# Patient Record
Sex: MALE | Race: WHITE | NOT HISPANIC OR LATINO | Employment: UNEMPLOYED | URBAN - METROPOLITAN AREA
[De-identification: names, ages, dates, MRNs, and addresses within clinical notes are randomized per-mention and may not be internally consistent; named-entity substitution may affect disease eponyms.]

---

## 2019-08-05 ENCOUNTER — OFFICE VISIT (OUTPATIENT)
Dept: UROLOGY | Facility: CLINIC | Age: 34
End: 2019-08-05
Payer: MEDICAID

## 2019-08-05 VITALS
WEIGHT: 129.44 LBS | BODY MASS INDEX: 19.17 KG/M2 | SYSTOLIC BLOOD PRESSURE: 111 MMHG | HEART RATE: 78 BPM | DIASTOLIC BLOOD PRESSURE: 74 MMHG | HEIGHT: 69 IN

## 2019-08-05 DIAGNOSIS — N20.1 LEFT URETERAL CALCULUS: Primary | ICD-10-CM

## 2019-08-05 DIAGNOSIS — N20.0 RIGHT NEPHROLITHIASIS: ICD-10-CM

## 2019-08-05 DIAGNOSIS — N13.30 HYDRONEPHROSIS OF LEFT KIDNEY: ICD-10-CM

## 2019-08-05 DIAGNOSIS — N13.4 HYDROURETER, LEFT: ICD-10-CM

## 2019-08-05 LAB
BACTERIA #/AREA URNS AUTO: ABNORMAL /HPF
BILIRUB SERPL-MCNC: NORMAL MG/DL
BILIRUB UR QL STRIP: NEGATIVE
BLOOD URINE, POC: NORMAL
CLARITY UR REFRACT.AUTO: CLEAR
COLOR UR AUTO: YELLOW
COLOR, POC UA: YELLOW
GLUCOSE UR QL STRIP: NEGATIVE
GLUCOSE UR QL STRIP: NORMAL
HGB UR QL STRIP: ABNORMAL
KETONES UR QL STRIP: NEGATIVE
KETONES UR QL STRIP: NORMAL
LEUKOCYTE ESTERASE UR QL STRIP: ABNORMAL
LEUKOCYTE ESTERASE URINE, POC: NORMAL
MICROSCOPIC COMMENT: ABNORMAL
NITRITE UR QL STRIP: NEGATIVE
NITRITE, POC UA: NORMAL
PH UR STRIP: 6 [PH] (ref 5–8)
PH, POC UA: 6.5
PROT UR QL STRIP: NEGATIVE
PROTEIN, POC: NORMAL
RBC #/AREA URNS AUTO: 7 /HPF (ref 0–4)
SP GR UR STRIP: 1.01 (ref 1–1.03)
SPECIFIC GRAVITY, POC UA: 1.01
URN SPEC COLLECT METH UR: ABNORMAL
UROBILINOGEN, POC UA: 0.2
WBC #/AREA URNS AUTO: 6 /HPF (ref 0–5)

## 2019-08-05 PROCEDURE — 99203 OFFICE O/P NEW LOW 30 MIN: CPT | Mod: S$PBB,,, | Performed by: NURSE PRACTITIONER

## 2019-08-05 PROCEDURE — 99203 PR OFFICE/OUTPT VISIT, NEW, LEVL III, 30-44 MIN: ICD-10-PCS | Mod: S$PBB,,, | Performed by: NURSE PRACTITIONER

## 2019-08-05 PROCEDURE — 99999 PR PBB SHADOW E&M-NEW PATIENT-LVL IV: ICD-10-PCS | Mod: PBBFAC,,, | Performed by: NURSE PRACTITIONER

## 2019-08-05 PROCEDURE — 99204 OFFICE O/P NEW MOD 45 MIN: CPT | Mod: PBBFAC,PO | Performed by: NURSE PRACTITIONER

## 2019-08-05 PROCEDURE — 81002 URINALYSIS NONAUTO W/O SCOPE: CPT | Mod: PBBFAC,PO,59 | Performed by: NURSE PRACTITIONER

## 2019-08-05 PROCEDURE — 81001 URINALYSIS AUTO W/SCOPE: CPT

## 2019-08-05 PROCEDURE — 87086 URINE CULTURE/COLONY COUNT: CPT

## 2019-08-05 PROCEDURE — 99999 PR PBB SHADOW E&M-NEW PATIENT-LVL IV: CPT | Mod: PBBFAC,,, | Performed by: NURSE PRACTITIONER

## 2019-08-05 RX ORDER — ADALIMUMAB 40MG/0.8ML
KIT SUBCUTANEOUS
Refills: 3 | COMMUNITY
Start: 2019-07-08

## 2019-08-05 RX ORDER — CYCLOBENZAPRINE HCL 5 MG
5 TABLET ORAL
COMMUNITY
Start: 2014-09-03

## 2019-08-05 RX ORDER — FOLIC ACID 1 MG/1
1 TABLET ORAL
COMMUNITY
Start: 2015-03-23

## 2019-08-05 NOTE — LETTER
August 5, 2019      Ricky Arnett MD  [address] [facility] [address]           [place] [facility]  [address] [place] [address], [address]  [facility] [address]  Phone: [phone]  Fax: [phone]          Patient: Hemal Faust   MR Number: [id number]   YOB: 1985   Date of Visit: 8/5/2019       Dear Dr. Ricky Arnett:    Thank you for referring Hemal Faust to me for evaluation. Attached you will find relevant portions of my assessment and plan of care.    If you have questions, please do not hesitate to call me. I look forward to following Hemal Faust along with you.    Sincerely,    Charles Hewitt, NP    Enclosure  CC:  No Recipients    If you would like to receive this communication electronically, please contact [email or web address] or [phone] to request more information on [employer] access.    For providers and/or their staff who would like to refer a patient to [facility], please contact us through our one-stop-shop provider referral line, [facility], at [phone].    If you feel you have received this communication in error or would no longer like to receive these types of communications, please e-mail [email or web address]

## 2019-08-05 NOTE — PATIENT INSTRUCTIONS
1. U/A   2. Urine cx  3. Schedule left renal lithotripsy with Dr. Haney for Thursday, 8/15/2019.   4. Follow-up post-op.

## 2019-08-05 NOTE — PROGRESS NOTES
"Subjective:       Patient ID: Hemal Faust is a 34 y.o. male.    Chief Complaint: Nephrolithiasis (and ureterolithiasis)    Patient is new to me. He is a 33 yo WM who is here today with left ureteral calculi and right nephrolithiasis evaluation. Patient was seen in 7/2/19 in the ER for epigastric pain. CT was performed at that time and showed, "The right kidney contains a 0.4 cm stone.  There is moderate left-sided hydronephrosis and hydroureter as the result of a 1 cm stone just distal to the UPJ.  This was present on previous CT dated 02/09/2017 but does appear worse." Patient denies any pain at this time and denies passing any stone particles.    Other   This is a chronic (Nephrolithiasis and ureterolithiasis) problem. Episode onset: Approximately 2 years. The problem occurs rarely. Associated symptoms include arthralgias and fatigue. Pertinent negatives include no abdominal pain, anorexia, change in bowel habit, chills, diaphoresis, fever, headaches, nausea, swollen glands, urinary symptoms, vomiting or weakness. Nothing aggravates the symptoms. He has tried nothing for the symptoms.     Review of Systems   Constitutional: Positive for fatigue. Negative for appetite change, chills, diaphoresis and fever.   Gastrointestinal: Negative for abdominal pain, anorexia, change in bowel habit, constipation, diarrhea, nausea and vomiting.   Genitourinary: Positive for urgency. Negative for decreased urine volume, difficulty urinating, discharge, dysuria, flank pain, frequency, hematuria, penile pain, penile swelling, scrotal swelling and testicular pain.   Musculoskeletal: Positive for arthralgias and back pain (lower back/spine and hips).   Neurological: Negative for dizziness, weakness and headaches.   Psychiatric/Behavioral: Negative.        Objective:      Physical Exam   Constitutional: He is oriented to person, place, and time. He appears well-developed and well-nourished. No distress.   HENT:   Head: Normocephalic " and atraumatic.   Eyes: Pupils are equal, round, and reactive to light. EOM are normal.   Neck: Normal range of motion.   Cardiovascular: Normal rate.   Pulmonary/Chest: Effort normal. No respiratory distress.   Abdominal: Soft. There is no tenderness.   Musculoskeletal: Normal range of motion. He exhibits no edema.   No bilateral CVAT   Neurological: He is alert and oriented to person, place, and time. Coordination normal.   Skin: Skin is warm and dry.   Psychiatric: He has a normal mood and affect. His behavior is normal. Judgment and thought content normal.   Nursing note and vitals reviewed.      Assessment:       1. Left ureteral calculus    2. Hydronephrosis of left kidney    3. Hydroureter, left    4. Right nephrolithiasis        Plan:       Hemal was seen today for nephrolithiasis.    Diagnoses and all orders for this visit:    Left ureteral calculus  -     POCT URINE DIPSTICK WITHOUT MICROSCOPE  -     Urinalysis  -     Urine culture    Hydronephrosis of left kidney  -     POCT URINE DIPSTICK WITHOUT MICROSCOPE  -     Urinalysis  -     Urine culture    Hydroureter, left  -     POCT URINE DIPSTICK WITHOUT MICROSCOPE  -     Urinalysis  -     Urine culture    Right nephrolithiasis  -     POCT URINE DIPSTICK WITHOUT MICROSCOPE  -     Urinalysis  -     Urine culture    Other order  1. Schedule left renal lithotripsy with Dr. Haney for Thursday, 8/15/2019.     Follow-up post-op.     Charles Hewitt NP

## 2019-08-06 LAB — BACTERIA UR CULT: NORMAL

## 2019-08-07 ENCOUNTER — TELEPHONE (OUTPATIENT)
Dept: UROLOGY | Facility: CLINIC | Age: 34
End: 2019-08-07

## 2019-08-08 ENCOUNTER — TELEPHONE (OUTPATIENT)
Dept: UROLOGY | Facility: CLINIC | Age: 34
End: 2019-08-08

## 2019-08-08 DIAGNOSIS — N20.0 KIDNEY STONE: Primary | ICD-10-CM

## 2019-08-08 RX ORDER — SODIUM CHLORIDE 9 MG/ML
INJECTION, SOLUTION INTRAVENOUS CONTINUOUS
Status: CANCELLED | OUTPATIENT
Start: 2019-08-08

## 2019-08-08 RX ORDER — CIPROFLOXACIN 2 MG/ML
400 INJECTION, SOLUTION INTRAVENOUS
Status: CANCELLED | OUTPATIENT
Start: 2019-08-08

## 2019-08-08 NOTE — TELEPHONE ENCOUNTER
----- Message from Charles Hewitt NP sent at 8/7/2019  8:59 AM CDT -----  Please inform patient his urine cx is normal. He does not have a UTI.

## 2019-08-12 DIAGNOSIS — N20.0 KIDNEY STONE: Primary | ICD-10-CM

## 2019-08-12 DIAGNOSIS — N13.30 HYDRONEPHROSIS OF LEFT KIDNEY: ICD-10-CM

## 2019-08-15 PROBLEM — N20.0 KIDNEY STONE: Status: ACTIVE | Noted: 2019-08-15

## 2021-06-27 ENCOUNTER — OFFICE VISIT (OUTPATIENT)
Dept: URGENT CARE | Facility: CLINIC | Age: 36
End: 2021-06-27

## 2021-06-27 VITALS
BODY MASS INDEX: 20.14 KG/M2 | HEIGHT: 69 IN | HEART RATE: 82 BPM | TEMPERATURE: 98 F | DIASTOLIC BLOOD PRESSURE: 70 MMHG | OXYGEN SATURATION: 98 % | RESPIRATION RATE: 12 BRPM | SYSTOLIC BLOOD PRESSURE: 121 MMHG | WEIGHT: 136 LBS

## 2021-06-27 DIAGNOSIS — Z86.19 HISTORY OF CHLAMYDIA: ICD-10-CM

## 2021-06-27 DIAGNOSIS — Z11.3 SCREENING FOR STD (SEXUALLY TRANSMITTED DISEASE): Primary | ICD-10-CM

## 2021-06-27 LAB
BILIRUB UR QL STRIP: NEGATIVE
GLUCOSE UR QL STRIP: NEGATIVE
KETONES UR QL STRIP: NEGATIVE
LEUKOCYTE ESTERASE UR QL STRIP: NEGATIVE
PH, POC UA: 5
POC BLOOD, URINE: NEGATIVE
POC NITRATES, URINE: NEGATIVE
PROT UR QL STRIP: NEGATIVE
SP GR UR STRIP: 1.01 (ref 1–1.03)
UROBILINOGEN UR STRIP-ACNC: NORMAL (ref 0.3–2.2)

## 2021-06-27 PROCEDURE — 99204 OFFICE O/P NEW MOD 45 MIN: CPT | Mod: 25,S$GLB,, | Performed by: NURSE PRACTITIONER

## 2021-06-27 PROCEDURE — 99204 PR OFFICE/OUTPT VISIT, NEW, LEVL IV, 45-59 MIN: ICD-10-PCS | Mod: 25,S$GLB,, | Performed by: NURSE PRACTITIONER

## 2021-06-27 PROCEDURE — 81003 POCT URINALYSIS, DIPSTICK, AUTOMATED, W/O SCOPE: ICD-10-PCS | Mod: QW,S$GLB,, | Performed by: NURSE PRACTITIONER

## 2021-06-27 PROCEDURE — 81003 URINALYSIS AUTO W/O SCOPE: CPT | Mod: QW,S$GLB,, | Performed by: NURSE PRACTITIONER

## 2021-07-06 ENCOUNTER — TELEPHONE (OUTPATIENT)
Dept: URGENT CARE | Facility: CLINIC | Age: 36
End: 2021-07-06

## 2021-07-14 LAB
C TRACH RRNA SPEC QL NAA+PROBE: POSITIVE
N GONORRHOEA RRNA SPEC QL NAA+PROBE: NEGATIVE
T VAGINALIS DNA SPEC QL NAA+PROBE: NEGATIVE

## 2021-07-15 ENCOUNTER — TELEPHONE (OUTPATIENT)
Dept: URGENT CARE | Facility: CLINIC | Age: 36
End: 2021-07-15

## 2021-07-15 RX ORDER — AZITHROMYCIN 500 MG/1
1000 TABLET, FILM COATED ORAL ONCE
Qty: 2 TABLET | Refills: 0 | Status: SHIPPED | OUTPATIENT
Start: 2021-07-15 | End: 2021-07-15

## 2024-08-15 ENCOUNTER — LAB VISIT (OUTPATIENT)
Dept: LAB | Facility: HOSPITAL | Age: 39
End: 2024-08-15
Attending: STUDENT IN AN ORGANIZED HEALTH CARE EDUCATION/TRAINING PROGRAM
Payer: COMMERCIAL

## 2024-08-15 ENCOUNTER — OFFICE VISIT (OUTPATIENT)
Dept: FAMILY MEDICINE | Facility: CLINIC | Age: 39
End: 2024-08-15
Payer: COMMERCIAL

## 2024-08-15 VITALS
RESPIRATION RATE: 16 BRPM | DIASTOLIC BLOOD PRESSURE: 60 MMHG | OXYGEN SATURATION: 99 % | SYSTOLIC BLOOD PRESSURE: 96 MMHG | BODY MASS INDEX: 20.87 KG/M2 | HEIGHT: 69 IN | WEIGHT: 140.88 LBS | HEART RATE: 54 BPM

## 2024-08-15 DIAGNOSIS — Z00.00 ROUTINE GENERAL MEDICAL EXAMINATION AT A HEALTH CARE FACILITY: ICD-10-CM

## 2024-08-15 DIAGNOSIS — M45.9 ANKYLOSING SPONDYLITIS, UNSPECIFIED SITE OF SPINE: ICD-10-CM

## 2024-08-15 DIAGNOSIS — Z87.442 HISTORY OF KIDNEY STONES: ICD-10-CM

## 2024-08-15 DIAGNOSIS — Z15.89 HLA B27 (HLA B27 POSITIVE): ICD-10-CM

## 2024-08-15 DIAGNOSIS — Z00.00 ROUTINE GENERAL MEDICAL EXAMINATION AT A HEALTH CARE FACILITY: Primary | ICD-10-CM

## 2024-08-15 LAB
ALBUMIN SERPL BCP-MCNC: 4.1 G/DL (ref 3.5–5.2)
ALP SERPL-CCNC: 91 U/L (ref 55–135)
ALT SERPL W/O P-5'-P-CCNC: 12 U/L (ref 10–44)
ANION GAP SERPL CALC-SCNC: 9 MMOL/L (ref 8–16)
AST SERPL-CCNC: 16 U/L (ref 10–40)
BASOPHILS # BLD AUTO: 0.07 K/UL (ref 0–0.2)
BASOPHILS NFR BLD: 0.8 % (ref 0–1.9)
BILIRUB SERPL-MCNC: 0.5 MG/DL (ref 0.1–1)
BUN SERPL-MCNC: 14 MG/DL (ref 6–20)
CALCIUM SERPL-MCNC: 9.5 MG/DL (ref 8.7–10.5)
CHLORIDE SERPL-SCNC: 106 MMOL/L (ref 95–110)
CHOLEST SERPL-MCNC: 153 MG/DL (ref 120–199)
CHOLEST/HDLC SERPL: 2.9 {RATIO} (ref 2–5)
CO2 SERPL-SCNC: 23 MMOL/L (ref 23–29)
CREAT SERPL-MCNC: 1.2 MG/DL (ref 0.5–1.4)
DIFFERENTIAL METHOD BLD: ABNORMAL
EOSINOPHIL # BLD AUTO: 0.1 K/UL (ref 0–0.5)
EOSINOPHIL NFR BLD: 1.2 % (ref 0–8)
ERYTHROCYTE [DISTWIDTH] IN BLOOD BY AUTOMATED COUNT: 13.1 % (ref 11.5–14.5)
EST. GFR  (NO RACE VARIABLE): >60 ML/MIN/1.73 M^2
ESTIMATED AVG GLUCOSE: 94 MG/DL (ref 68–131)
GLUCOSE SERPL-MCNC: 85 MG/DL (ref 70–110)
HAV IGM SERPL QL IA: NORMAL
HBA1C MFR BLD: 4.9 % (ref 4–5.6)
HBV CORE IGM SERPL QL IA: NORMAL
HBV SURFACE AG SERPL QL IA: NORMAL
HCT VFR BLD AUTO: 44.8 % (ref 40–54)
HCV AB SERPL QL IA: NORMAL
HDLC SERPL-MCNC: 52 MG/DL (ref 40–75)
HDLC SERPL: 34 % (ref 20–50)
HGB BLD-MCNC: 15 G/DL (ref 14–18)
IMM GRANULOCYTES # BLD AUTO: 0.02 K/UL (ref 0–0.04)
IMM GRANULOCYTES NFR BLD AUTO: 0.2 % (ref 0–0.5)
LDLC SERPL CALC-MCNC: 86.8 MG/DL (ref 63–159)
LYMPHOCYTES # BLD AUTO: 1.5 K/UL (ref 1–4.8)
LYMPHOCYTES NFR BLD: 16.9 % (ref 18–48)
MCH RBC QN AUTO: 29.9 PG (ref 27–31)
MCHC RBC AUTO-ENTMCNC: 33.5 G/DL (ref 32–36)
MCV RBC AUTO: 89 FL (ref 82–98)
MONOCYTES # BLD AUTO: 0.6 K/UL (ref 0.3–1)
MONOCYTES NFR BLD: 7.1 % (ref 4–15)
NEUTROPHILS # BLD AUTO: 6.6 K/UL (ref 1.8–7.7)
NEUTROPHILS NFR BLD: 73.8 % (ref 38–73)
NONHDLC SERPL-MCNC: 101 MG/DL
NRBC BLD-RTO: 0 /100 WBC
PLATELET # BLD AUTO: 248 K/UL (ref 150–450)
PMV BLD AUTO: 10.3 FL (ref 9.2–12.9)
POTASSIUM SERPL-SCNC: 4.1 MMOL/L (ref 3.5–5.1)
PROT SERPL-MCNC: 7.6 G/DL (ref 6–8.4)
RBC # BLD AUTO: 5.02 M/UL (ref 4.6–6.2)
SODIUM SERPL-SCNC: 138 MMOL/L (ref 136–145)
T4 FREE SERPL-MCNC: 0.91 NG/DL (ref 0.71–1.51)
TRIGL SERPL-MCNC: 71 MG/DL (ref 30–150)
TSH SERPL DL<=0.005 MIU/L-ACNC: 1.03 UIU/ML (ref 0.4–4)
WBC # BLD AUTO: 8.92 K/UL (ref 3.9–12.7)

## 2024-08-15 PROCEDURE — 36415 COLL VENOUS BLD VENIPUNCTURE: CPT | Mod: PO | Performed by: STUDENT IN AN ORGANIZED HEALTH CARE EDUCATION/TRAINING PROGRAM

## 2024-08-15 PROCEDURE — 80074 ACUTE HEPATITIS PANEL: CPT | Performed by: STUDENT IN AN ORGANIZED HEALTH CARE EDUCATION/TRAINING PROGRAM

## 2024-08-15 PROCEDURE — 1160F RVW MEDS BY RX/DR IN RCRD: CPT | Mod: CPTII,S$GLB,, | Performed by: STUDENT IN AN ORGANIZED HEALTH CARE EDUCATION/TRAINING PROGRAM

## 2024-08-15 PROCEDURE — 84443 ASSAY THYROID STIM HORMONE: CPT | Performed by: STUDENT IN AN ORGANIZED HEALTH CARE EDUCATION/TRAINING PROGRAM

## 2024-08-15 PROCEDURE — 84439 ASSAY OF FREE THYROXINE: CPT | Performed by: STUDENT IN AN ORGANIZED HEALTH CARE EDUCATION/TRAINING PROGRAM

## 2024-08-15 PROCEDURE — 3008F BODY MASS INDEX DOCD: CPT | Mod: CPTII,S$GLB,, | Performed by: STUDENT IN AN ORGANIZED HEALTH CARE EDUCATION/TRAINING PROGRAM

## 2024-08-15 PROCEDURE — 80053 COMPREHEN METABOLIC PANEL: CPT | Performed by: STUDENT IN AN ORGANIZED HEALTH CARE EDUCATION/TRAINING PROGRAM

## 2024-08-15 PROCEDURE — 1159F MED LIST DOCD IN RCRD: CPT | Mod: CPTII,S$GLB,, | Performed by: STUDENT IN AN ORGANIZED HEALTH CARE EDUCATION/TRAINING PROGRAM

## 2024-08-15 PROCEDURE — 80061 LIPID PANEL: CPT | Performed by: STUDENT IN AN ORGANIZED HEALTH CARE EDUCATION/TRAINING PROGRAM

## 2024-08-15 PROCEDURE — 3078F DIAST BP <80 MM HG: CPT | Mod: CPTII,S$GLB,, | Performed by: STUDENT IN AN ORGANIZED HEALTH CARE EDUCATION/TRAINING PROGRAM

## 2024-08-15 PROCEDURE — 85025 COMPLETE CBC W/AUTO DIFF WBC: CPT | Performed by: STUDENT IN AN ORGANIZED HEALTH CARE EDUCATION/TRAINING PROGRAM

## 2024-08-15 PROCEDURE — 3074F SYST BP LT 130 MM HG: CPT | Mod: CPTII,S$GLB,, | Performed by: STUDENT IN AN ORGANIZED HEALTH CARE EDUCATION/TRAINING PROGRAM

## 2024-08-15 PROCEDURE — 83036 HEMOGLOBIN GLYCOSYLATED A1C: CPT | Performed by: STUDENT IN AN ORGANIZED HEALTH CARE EDUCATION/TRAINING PROGRAM

## 2024-08-15 PROCEDURE — 99999 PR PBB SHADOW E&M-NEW PATIENT-LVL V: CPT | Mod: PBBFAC,,, | Performed by: STUDENT IN AN ORGANIZED HEALTH CARE EDUCATION/TRAINING PROGRAM

## 2024-08-15 PROCEDURE — 99395 PREV VISIT EST AGE 18-39: CPT | Mod: S$GLB,,, | Performed by: STUDENT IN AN ORGANIZED HEALTH CARE EDUCATION/TRAINING PROGRAM

## 2024-08-15 RX ORDER — CYCLOBENZAPRINE HCL 5 MG
5 TABLET ORAL NIGHTLY PRN
Qty: 30 TABLET | Refills: 0 | Status: SHIPPED | OUTPATIENT
Start: 2024-08-15

## 2024-08-15 RX ORDER — ADALIMUMAB 40MG/0.8ML
40 KIT SUBCUTANEOUS
Qty: 6 PEN | Refills: 1 | Status: SHIPPED | OUTPATIENT
Start: 2024-08-15

## 2024-08-15 NOTE — PROGRESS NOTES
[facility]  Office Visit Note     SUBJECTIVE:   HPI: Hemal Faust  is a 39 y.o. male here to establish care     Patient's medical conditions including ankylosing spondylitis and he is HLA -B27 positive     Patient has been on Humira about 3-4 years.  He was diagnosed with ankylosing spondylitis at the age of 21.    He believes his last rheumatology follow up was August of last year   Patient has not been following up with Rheumatology due to insurance issues.      He is , has a 2 year old boy   Work at a body shop, painting cars   Denies any current marijuana use   Former smoker     For ankylosing spondylitis, patient reports having back stiffness, back spasm that is intermittent.      Surgical history notable for intussusception surgery about 8-9 years prior    No visits with results within 6 Month(s) from this visit.   Latest known visit with results is:   Orders Only on 07/12/2021   Component Date Value Ref Range Status    Chlamydia trachomatis, BRAD 07/12/2021 Positive (A)  Negative Final    Gonococcus by Nucleic Acid Amp 07/12/2021 Negative  Negative Final    Trich vag by BRAD 07/12/2021 Negative  Negative Final         Current Outpatient Medications on File Prior to Visit   Medication Sig Dispense Refill    [DISCONTINUED] acetaminophen (TYLENOL) 325 MG tablet Take 1 tablet (325 mg total) by mouth every 6 (six) hours as needed for Pain. (Patient not taking: Reported on 6/27/2021)      [DISCONTINUED] adalimumab (HUMIRA PEN) 40 mg/0.8 mL PnKt INJECT 1 PEN IN THE SKIN Q 14 DAYS (Patient not taking: Reported on 8/15/2024)  3    [DISCONTINUED] cyclobenzaprine (FLEXERIL) 5 MG tablet Take 5 mg by mouth. (Patient not taking: Reported on 8/15/2024)      [DISCONTINUED] folic acid (FOLVITE) 1 MG tablet Take 1 mg by mouth. (Patient not taking: Reported on 8/15/2024)      [DISCONTINUED] ranitidine (ZANTAC) 150 MG capsule Take 1 capsule (150 mg total) by mouth once daily. (Patient not taking:  Reported on 8/15/2024) 15 capsule 0    [DISCONTINUED] tamsulosin (FLOMAX) 0.4 mg Cap Take 1 capsule (0.4 mg total) by mouth every evening. (Patient not taking: Reported on 6/27/2021) 30 capsule 1     No current facility-administered medications on file prior to visit.     Past Medical History:   Diagnosis Date    Ankylosing spondylitis     Intussusception      Past Surgical History:   Procedure Laterality Date    ABDOMINAL SURGERY      ARTHROSCOPY OF KNEE Right     COLON SURGERY      EXTRACORPOREAL SHOCK WAVE LITHOTRIPSY Left 8/15/2019    Procedure: LITHOTRIPSY, ESWL;  Surgeon: Hemal Haney MD;  [facility];  Service: Urology;  Laterality: Left;     Past Surgical History:   Procedure Laterality Date    ABDOMINAL SURGERY      ARTHROSCOPY OF KNEE Right     COLON SURGERY      EXTRACORPOREAL SHOCK WAVE LITHOTRIPSY Left 8/15/2019    Procedure: LITHOTRIPSY, ESWL;  Surgeon: Hemal Haney MD;  Location: [facility];  Service: Urology;  Laterality: Left;     Family History   Problem Relation Name Age of Onset    Throat cancer Mother      No Known Problems Father         Marital Status:   Alcohol History:  reports current alcohol use.  Tobacco History:  reports that he quit smoking about 2 years ago. His smoking use included cigarettes. He has been exposed to tobacco smoke. He has never used smokeless tobacco.  Drug History:  reports that he does not currently use drugs after having used the following drugs: Marijuana.    Health Maintenance Topics with due status: Not Due       Topic Last Completion Date    Influenza Vaccine Not Due       There is no immunization history on file for this patient.    Review of patient's allergies indicates:  No Known Allergies    Current Outpatient Medications:     adalimumab (HUMIRA PEN) PnKt injection, Inject 1 pen  (40 mg total) into the skin every 14 (fourteen) days., Disp: 6 pen , Rfl: 1    cyclobenzaprine (FLEXERIL) 5 MG tablet, Take 1 tablet (5 mg total) by mouth nightly  "as needed for Muscle spasms., Disp: 30 tablet, Rfl: 0    Review of Systems   Constitutional:  Negative for chills and fever.   Respiratory:  Negative for chest tightness and shortness of breath.    Gastrointestinal:  Negative for blood in stool, nausea and vomiting.   Genitourinary:  Negative for hematuria.   Musculoskeletal:  Positive for back pain. Negative for gait problem.        Back spasm, back stiffness       OBJECTIVE:      Vitals:    08/15/24 0828 08/15/24 0855   BP: (!) 96/58 96/60   BP Location: Right arm Right arm   Patient Position: Sitting Sitting   BP Method: Medium (Manual) Medium (Manual)   Pulse: (!) 54    Resp: 16    SpO2: 99%    Weight: 63.9 kg (140 lb 14 oz)    Height: 5' 9" (1.753 m)      Physical Exam  Constitutional:       General: He is not in acute distress.     Appearance: He is not ill-appearing or toxic-appearing.   HENT:      Head: Normocephalic and atraumatic.      Mouth/Throat:      Mouth: Mucous membranes are moist.      Pharynx: Uvula midline. No pharyngeal swelling.   Cardiovascular:      Rate and Rhythm: Normal rate and regular rhythm.   Pulmonary:      Effort: Pulmonary effort is normal. No tachypnea, bradypnea, accessory muscle usage, prolonged expiration or respiratory distress.      Breath sounds: Normal breath sounds. No stridor. No wheezing, rhonchi or rales.   Abdominal:      General: Abdomen is flat. Bowel sounds are normal. There is no distension.      Palpations: Abdomen is soft.      Tenderness: There is no abdominal tenderness. There is no guarding or rebound.   Musculoskeletal:      Comments: Cervical, thoracic, and lumbar spine nontender to palpation    Neurological:      General: No focal deficit present.      Mental Status: He is alert.   Psychiatric:         Mood and Affect: Mood normal.         Behavior: Behavior normal.        Assessment:       1. Routine general medical examination at a health care facility    2. Ankylosing spondylitis, unspecified site of " spine    3. HLA B27 (HLA B27 positive)    4. History of kidney stones        Plan:       Routine general medical examination at a health care facility  -     CBC Auto Differential; Future; Expected date: 08/15/2024  -     Comprehensive Metabolic Panel; Future; Expected date: 08/15/2024  -     Hemoglobin A1C; Future; Expected date: 08/15/2024  -     Lipid Panel; Future; Expected date: 08/15/2024  -     Hepatitis Panel, Acute; Future; Expected date: 08/15/2024  -     Ambulatory referral/consult to Rheumatology; Future; Expected date: 08/21/2024  -     TSH; Future; Expected date: 08/15/2024  -     T4, Free; Future; Expected date: 08/15/2024  -     FOLATE; Future; Expected date: 08/15/2024  Advised on the importance of annual eye exam and Q6 months dental cleaning/exam     Ankylosing spondylitis, unspecified site of spine  -     CBC Auto Differential; Future; Expected date: 08/15/2024  -     Comprehensive Metabolic Panel; Future; Expected date: 08/15/2024  -     Hemoglobin A1C; Future; Expected date: 08/15/2024  -     Lipid Panel; Future; Expected date: 08/15/2024  -     Hepatitis Panel, Acute; Future; Expected date: 08/15/2024  -     Ambulatory referral/consult to Rheumatology; Future; Expected date: 08/21/2024  -     adalimumab (HUMIRA PEN) PnKt injection; Inject 1 pen  (40 mg total) into the skin every 14 (fourteen) days.  Dispense: 6 pen ; Refill: 1  -     cyclobenzaprine (FLEXERIL) 5 MG tablet; Take 1 tablet (5 mg total) by mouth nightly as needed for Muscle spasms.  Dispense: 30 tablet; Refill: 0  Humira refill sent in  Explained to the patient that Humira needs to be monitored carefully by rheumatology. Further refill will be provided by rheumatology  Flexeril for intermittent muscle spasm -- advised to use it nightly PRN as it can cause drowsiness. Avoid driving or operating heavy machinery     HLA B27 (HLA B27 positive)    History of kidney stones  S/p lithotripsy   Has not been having kidney stones  Continue  with ample hydration       Counseled on age and gender appropriate medical preventative services, including cancer screenings, immunizations, overall nutritional health, need for a consistent exercise regimen and an overall push towards maintaining a vigorous and active lifestyle.      Follow up in 6 months for ankylosing spondylitis with yesenia Reddy M.D.  8/15/2024    This note was created using [employer] voice recognition software that occasionally misinterprets phrases or words

## 2024-08-15 NOTE — PATIENT INSTRUCTIONS
Mikel Recio,     If you are due for any health screening(s) below please notify me so we can arrange them to be ordered and scheduled. Most healthy patients at your age complete them, but you are free to accept or refuse.     If you can't do it, I'll definitely understand. If you can, I'd certainly appreciate it!    All of your core healthy metrics are met.

## 2024-08-16 ENCOUNTER — TELEPHONE (OUTPATIENT)
Dept: FAMILY MEDICINE | Facility: CLINIC | Age: 39
End: 2024-08-16
Payer: COMMERCIAL

## 2024-08-16 NOTE — TELEPHONE ENCOUNTER
Spoke to pharmacist who advised wanting to confirm pt is taking Humira (traditional) pen or Humira citrate free pen. Pharmacist states they normally fill the Humira citrate free for most patients so they were just wanting to confirm prior to filling. Please advise if correct RX was sent

## 2024-08-16 NOTE — TELEPHONE ENCOUNTER
----- Message from Yanna Cartagena sent at 8/16/2024 10:15 AM CDT -----  Type: Needs Medical Advice  Who Called:  Kd from [facility] employee pharmacy  Symptoms (please be specific):  said he need to speak to the office about pt's adalimumab (HUMIRA PEN) PnKt injection--please call and advise    Pharmacy name and phone #:    [facility] - [address] [address]  Phone: [phone] Fax: [phone]  Best Call Back Number: [phone]  Additional Information: thank you

## 2024-08-21 ENCOUNTER — PATIENT MESSAGE (OUTPATIENT)
Dept: FAMILY MEDICINE | Facility: CLINIC | Age: 39
End: 2024-08-21
Payer: COMMERCIAL

## 2024-08-21 DIAGNOSIS — M45.9 ANKYLOSING SPONDYLITIS, UNSPECIFIED SITE OF SPINE: ICD-10-CM

## 2024-08-21 DIAGNOSIS — Z00.00 ROUTINE GENERAL MEDICAL EXAMINATION AT A HEALTH CARE FACILITY: Primary | ICD-10-CM

## 2024-08-21 RX ORDER — CYCLOBENZAPRINE HCL 5 MG
5 TABLET ORAL NIGHTLY PRN
Qty: 30 TABLET | Refills: 0 | Status: SHIPPED | OUTPATIENT
Start: 2024-08-21

## 2024-08-23 ENCOUNTER — LAB VISIT (OUTPATIENT)
Dept: LAB | Facility: HOSPITAL | Age: 39
End: 2024-08-23
Attending: STUDENT IN AN ORGANIZED HEALTH CARE EDUCATION/TRAINING PROGRAM
Payer: COMMERCIAL

## 2024-08-23 DIAGNOSIS — Z00.00 ROUTINE GENERAL MEDICAL EXAMINATION AT A HEALTH CARE FACILITY: ICD-10-CM

## 2024-08-23 LAB
FOLATE SERPL-MCNC: 5.7 NG/ML (ref 4–24)
HIV 1+2 AB+HIV1 P24 AG SERPL QL IA: NORMAL
TREPONEMA PALLIDUM IGG+IGM AB [PRESENCE] IN SERUM OR PLASMA BY IMMUNOASSAY: NONREACTIVE

## 2024-08-23 PROCEDURE — 82746 ASSAY OF FOLIC ACID SERUM: CPT | Performed by: STUDENT IN AN ORGANIZED HEALTH CARE EDUCATION/TRAINING PROGRAM

## 2024-08-23 PROCEDURE — 87661 TRICHOMONAS VAGINALIS AMPLIF: CPT | Performed by: STUDENT IN AN ORGANIZED HEALTH CARE EDUCATION/TRAINING PROGRAM

## 2024-08-23 PROCEDURE — 36415 COLL VENOUS BLD VENIPUNCTURE: CPT | Mod: PO | Performed by: STUDENT IN AN ORGANIZED HEALTH CARE EDUCATION/TRAINING PROGRAM

## 2024-08-23 PROCEDURE — 86593 SYPHILIS TEST NON-TREP QUANT: CPT | Performed by: STUDENT IN AN ORGANIZED HEALTH CARE EDUCATION/TRAINING PROGRAM

## 2024-08-23 PROCEDURE — 87389 HIV-1 AG W/HIV-1&-2 AB AG IA: CPT | Performed by: STUDENT IN AN ORGANIZED HEALTH CARE EDUCATION/TRAINING PROGRAM

## 2024-08-26 LAB
SPECIMEN SOURCE: NORMAL
T VAGINALIS RRNA SPEC QL NAA+PROBE: NEGATIVE

## 2024-09-13 ENCOUNTER — PATIENT MESSAGE (OUTPATIENT)
Dept: FAMILY MEDICINE | Facility: CLINIC | Age: 39
End: 2024-09-13
Payer: COMMERCIAL

## 2024-09-18 ENCOUNTER — PATIENT MESSAGE (OUTPATIENT)
Dept: FAMILY MEDICINE | Facility: CLINIC | Age: 39
End: 2024-09-18
Payer: COMMERCIAL

## 2025-02-17 ENCOUNTER — TELEPHONE (OUTPATIENT)
Dept: FAMILY MEDICINE | Facility: CLINIC | Age: 40
End: 2025-02-17
Payer: COMMERCIAL

## 2025-02-25 ENCOUNTER — RESULTS FOLLOW-UP (OUTPATIENT)
Dept: FAMILY MEDICINE | Facility: CLINIC | Age: 40
End: 2025-02-25

## 2025-02-25 ENCOUNTER — HOSPITAL ENCOUNTER (OUTPATIENT)
Dept: RADIOLOGY | Facility: HOSPITAL | Age: 40
Discharge: HOME | End: 2025-02-25
Payer: COMMERCIAL

## 2025-02-25 ENCOUNTER — OFFICE VISIT (OUTPATIENT)
Dept: FAMILY MEDICINE | Facility: CLINIC | Age: 40
End: 2025-02-25
Payer: COMMERCIAL

## 2025-02-25 VITALS
HEIGHT: 69 IN | HEART RATE: 99 BPM | WEIGHT: 136.88 LBS | SYSTOLIC BLOOD PRESSURE: 100 MMHG | OXYGEN SATURATION: 96 % | TEMPERATURE: 98 F | DIASTOLIC BLOOD PRESSURE: 60 MMHG | BODY MASS INDEX: 20.27 KG/M2

## 2025-02-25 DIAGNOSIS — R11.2 NAUSEA AND VOMITING, UNSPECIFIED VOMITING TYPE: ICD-10-CM

## 2025-02-25 DIAGNOSIS — R10.31 RIGHT LOWER QUADRANT PAIN: ICD-10-CM

## 2025-02-25 DIAGNOSIS — R11.2 NAUSEA AND VOMITING, UNSPECIFIED VOMITING TYPE: Primary | ICD-10-CM

## 2025-02-25 DIAGNOSIS — R10.33 PERIUMBILICAL ABDOMINAL PAIN: ICD-10-CM

## 2025-02-25 PROBLEM — R19.7 DIARRHEA: Status: ACTIVE | Noted: 2025-02-25

## 2025-02-25 PROBLEM — E87.1 HYPONATREMIA: Status: ACTIVE | Noted: 2025-02-25

## 2025-02-25 PROBLEM — K52.9 ENTERITIS: Status: ACTIVE | Noted: 2025-02-25

## 2025-02-25 PROCEDURE — 74177 CT ABD & PELVIS W/CONTRAST: CPT | Mod: 26,,, | Performed by: RADIOLOGY

## 2025-02-25 PROCEDURE — 1159F MED LIST DOCD IN RCRD: CPT | Mod: CPTII,S$GLB,,

## 2025-02-25 PROCEDURE — 25500020 PHARM REV CODE 255

## 2025-02-25 PROCEDURE — 99999 PR PBB SHADOW E&M-EST. PATIENT-LVL III: CPT | Mod: PBBFAC,,,

## 2025-02-25 PROCEDURE — 3008F BODY MASS INDEX DOCD: CPT | Mod: CPTII,S$GLB,,

## 2025-02-25 PROCEDURE — 74177 CT ABD & PELVIS W/CONTRAST: CPT | Mod: TC

## 2025-02-25 PROCEDURE — 3078F DIAST BP <80 MM HG: CPT | Mod: CPTII,S$GLB,,

## 2025-02-25 PROCEDURE — 3074F SYST BP LT 130 MM HG: CPT | Mod: CPTII,S$GLB,,

## 2025-02-25 PROCEDURE — 1160F RVW MEDS BY RX/DR IN RCRD: CPT | Mod: CPTII,S$GLB,,

## 2025-02-25 PROCEDURE — 99214 OFFICE O/P EST MOD 30 MIN: CPT | Mod: S$GLB,,,

## 2025-02-25 RX ORDER — ONDANSETRON 4 MG/1
4 TABLET, FILM COATED ORAL
Status: CANCELLED | OUTPATIENT
Start: 2025-02-25 | End: 2025-02-25

## 2025-02-25 RX ADMIN — IOHEXOL 30 ML: 300 INJECTION, SOLUTION INTRAVENOUS at 10:02

## 2025-02-25 RX ADMIN — IOHEXOL 75 ML: 350 INJECTION, SOLUTION INTRAVENOUS at 10:02

## 2025-02-25 NOTE — PROGRESS NOTES
Subjective:       Patient ID: Hemal Faust is a 39 y.o. male.    Chief Complaint: abdominal pain       Hemal Faust is a 39 y.o. male with ankylosing spondylitis, h/o kidney stones who presents to clinic for evaluation of intermittent abdominal pain x 5 days. Pain is constantly 3/10 and when worsening it becomes 8/10 in intensity. Abdominal pain began late Friday night in the periumbilical area. He states when pain starts it occurs in periumbilical area and radiates to RLQ. Pain is characterized by stabbing. Pain is worse with movement (turning over in bed, walking, driving). He reports brief episodes of nausea over the weekend, but this morning had 2 episodes of sweats, nausea, vomiting and diarrhea. Denies fever, chills, blood in the stool, dysuria, flank pain or hematuria. Pain is not similar to what he experienced with kidney stone. He took Tylenol and Ibuprofen last night and Pepto over the weekend without relief.          Review of Systems   Constitutional:  Positive for diaphoresis. Negative for chills and fever.   Gastrointestinal:  Positive for abdominal pain, diarrhea, nausea and vomiting. Negative for blood in stool.   Genitourinary:  Negative for dysuria, flank pain and hematuria.         Past Medical History:   Diagnosis Date    Ankylosing spondylitis     Intussusception        Review of patient's allergies indicates:  No Known Allergies    Current Medications[1]    Objective:        Physical Exam  Vitals reviewed.   Constitutional:       General: He is not in acute distress.     Appearance: Normal appearance.   HENT:      Head: Normocephalic and atraumatic.   Eyes:      Conjunctiva/sclera: Conjunctivae normal.   Cardiovascular:      Rate and Rhythm: Normal rate and regular rhythm.      Heart sounds: Normal heart sounds.   Pulmonary:      Effort: Pulmonary effort is normal.      Breath sounds: Normal breath sounds.   Abdominal:      General: Bowel sounds are normal.      Palpations: Abdomen is soft.  "     Tenderness: There is abdominal tenderness in the right lower quadrant. There is guarding. There is no rebound. Positive signs include Rovsing's sign, McBurney's sign and obturator sign. Negative signs include psoas sign.   Musculoskeletal:         General: Normal range of motion.      Cervical back: Normal range of motion.   Skin:     General: Skin is warm and dry.   Neurological:      Mental Status: He is alert and oriented to person, place, and time.   Psychiatric:         Behavior: Behavior normal.           Visit Vitals  /60 (BP Location: Right arm, Patient Position: Sitting)   Pulse 99   Temp 98.1 °F (36.7 °C) (Oral)   Ht 5' 9" (1.753 m)   Wt 62.1 kg (136 lb 14.5 oz)   SpO2 96%   BMI 20.22 kg/m²      Assessment:         1. Nausea and vomiting, unspecified vomiting type    2. Periumbilical abdominal pain    3. Right lower quadrant pain        Plan:         Diagnoses and all orders for this visit:    Nausea and vomiting, unspecified vomiting type  -     CBC Auto Differential; Future  -     Comprehensive Metabolic Panel; Future  -     Lipase; Future  -     CT Abdomen Pelvis With IV Contrast Routine Oral Contrast; Future    Periumbilical abdominal pain  -     CT Abdomen Pelvis With IV Contrast Routine Oral Contrast; Future    Right lower quadrant pain  -     CBC Auto Differential; Future  -     Comprehensive Metabolic Panel; Future  -     Lipase; Future  -     CT Abdomen Pelvis With IV Contrast Routine Oral Contrast; Future    Will get STAT imaging and labs to assess for appendicitis. Further recommendations made pending labs/ imaging.     If symptoms worsen, pt instructed to go to ED.        Family Medicine Physician Assistant          All of your core healthy metrics are met.       I spent a total of 30 minutes on the day of the visit.This includes face to face time and non-face to face time preparing to see the patient (eg, review of tests), obtaining and/or reviewing separately obtained history, " documenting clinical information in the electronic or other health record, independently interpreting results and communicating results to the patient/family/caregiver, or care coordinator.         [1]   Current Outpatient Medications:     adalimumab (HUMIRA PEN) PnKt injection, Inject 1 pen  (40 mg total) into the skin every 14 (fourteen) days., Disp: 6 pen , Rfl: 1    cyclobenzaprine (FLEXERIL) 5 MG tablet, Take 1 tablet (5 mg total) by mouth nightly as needed for Muscle spasms., Disp: 30 tablet, Rfl: 0

## 2025-03-05 ENCOUNTER — OFFICE VISIT (OUTPATIENT)
Dept: GASTROENTEROLOGY | Facility: CLINIC | Age: 40
End: 2025-03-05
Payer: COMMERCIAL

## 2025-03-05 VITALS — WEIGHT: 136.88 LBS | BODY MASS INDEX: 21.48 KG/M2 | HEIGHT: 67 IN

## 2025-03-05 DIAGNOSIS — Z87.898 HISTORY OF DIARRHEA: ICD-10-CM

## 2025-03-05 DIAGNOSIS — R10.31 RIGHT LOWER QUADRANT ABDOMINAL PAIN: ICD-10-CM

## 2025-03-05 DIAGNOSIS — D84.9 IMMUNOSUPPRESSED STATUS: ICD-10-CM

## 2025-03-05 DIAGNOSIS — Z09 HOSPITAL DISCHARGE FOLLOW-UP: Primary | ICD-10-CM

## 2025-03-05 DIAGNOSIS — R93.3 ABNORMAL CT SCAN, GASTROINTESTINAL TRACT: ICD-10-CM

## 2025-03-05 PROCEDURE — 1159F MED LIST DOCD IN RCRD: CPT | Mod: CPTII,S$GLB,,

## 2025-03-05 PROCEDURE — 1160F RVW MEDS BY RX/DR IN RCRD: CPT | Mod: CPTII,S$GLB,,

## 2025-03-05 PROCEDURE — 99203 OFFICE O/P NEW LOW 30 MIN: CPT | Mod: S$GLB,,,

## 2025-03-05 PROCEDURE — 99999 PR PBB SHADOW E&M-EST. PATIENT-LVL IV: CPT | Mod: PBBFAC,,,

## 2025-03-05 PROCEDURE — 3008F BODY MASS INDEX DOCD: CPT | Mod: CPTII,S$GLB,,

## 2025-03-05 NOTE — PROGRESS NOTES
"Subjective:       Patient ID: Hemal Faust is a 39 y.o. male Body mass index is 21.44 kg/m².    Chief Complaint: Abdominal Pain and Hospital Follow Up    This patient is new to me.  Referring Provider: Dr. Veronika Valencia for RLQ pain.     Reviewed hospital discharge summary report from 02/26/25,  "Admission Date: 2/25/2025  Hospital Length of Stay: 0 days  Discharge Date and Time: 2/26/2025  4:19 PM  Attending Physician: No att. providers found   Discharging Provider: Veronika Valencia MD  Primary Care Provider: Veronika Reddy MD     Primary Care Team: Networked reference to record PCT      HPI: Patient is 39-year-old male with ankylosing spondylitis maintained on Humira, history of nephrolithiasis, history of tobacco use currently vaping, that presented to the emergency department after being evaluated by his primary care provider as outpatient with complaints of abdominal pain 1st noted 02/21/2025.  Patient reports intermittent stabbing pain to periumbilical area with radiation to right lower quadrant described as 3/10 at baseline waxing and waning as intense as 8/10.  Pain is exacerbated by movement, walking, driving.  Patient reports associated nausea which began in the p.m. to 03/20/2025 with worsening nausea, diaphoresis vomiting and diarrhea this a.m..  Patient denies hematemesis coffee-grounds bright red blood per rectum.  Patient denies flank pain CVA tenderness or hematuria.  Patient reports utilizing ibuprofen, Tylenol and Pepto-Bismol with no improvement in symptoms.  CT with findings of severe enteritis primarily in the distal ileum .  Patient to be admitted with surgical consult, IV fluids and IV antibiotics.     * No surgery found *       Hospital Course: The patient was admitted on IV fluids, NPO status for his enteritis.  General surgery was consulted and evaluated, no surgical issues identified.  It was felt that the CT scan findings were inconsistent with Crohn's disease.  However, follow-up for GI " "was arranged on discharge.  Diet was started with clear liquids and advanced to a regular diet which he tolerated.  He was discharged on oral antibiotics with close outpatient follow-up."    Abdominal Pain  This is a new problem. The current episode started 1 to 4 weeks ago (started prior to hospital admission 02/25/25 - was diagnosed with severe enteritis in distal ileum and norovirus). The onset quality is sudden. The problem occurs constantly. The problem has been resolved (started improving on Sunday and resolved by Monday). The pain is located in the RLQ. The pain is at a severity of 0/10 (denies pain currently). The patient is experiencing no pain. The quality of the pain is burning (stabbing). Associated symptoms include diarrhea (resolved - occurred around the time of hospital visit; currently having 1-2 BMs daily rated 2 and 5 on [place] scale; taking daily probiotic; GI pathogens panel detected norovirus 02/25/2025 - all other stool studies unremarkable). Pertinent negatives include no belching, constipation, dysuria, fever, flatus, hematochezia, hematuria, melena, nausea (resolved - has not had to take Phenergan), vomiting (resolved) or weight loss. The pain is aggravated by movement and palpation. The pain is relieved by Nothing. He has tried acetaminophen, antibiotics and oral narcotic analgesics (Currently taking daily probiotic; was prescribed oxycodone by ER; completed cipro and flagyl; past tx:  Pepto-Bismol) for the symptoms. The treatment provided significant relief. Prior diagnostic workup includes GI consult and CT scan. His past medical history is significant for abdominal surgery (Patient reports hx of intestinal surgery 10-12 years ago related to intussusception). There is no history of colon cancer, Crohn's disease, gallstones, GERD, irritable bowel syndrome, pancreatitis, PUD or ulcerative colitis. Patient's medical history includes kidney stones. Patient's medical history does not include " UTI.     Review of Systems   Constitutional:  Negative for activity change, appetite change, chills, diaphoresis, fatigue, fever, unexpected weight change and weight loss.   HENT:  Negative for sore throat and trouble swallowing.    Respiratory:  Negative for cough, choking and shortness of breath.    Cardiovascular:  Negative for chest pain.   Gastrointestinal:  Positive for abdominal pain and diarrhea (resolved - occurred around the time of hospital visit; currently having 1-2 BMs daily rated 2 and 5 on [place] scale; taking daily probiotic; GI pathogens panel detected norovirus 02/25/2025 - all other stool studies unremarkable). Negative for abdominal distention, anal bleeding, blood in stool, constipation, flatus, hematochezia, melena, nausea (resolved - has not had to take Phenergan), rectal pain and vomiting (resolved).   Genitourinary:  Negative for dysuria and hematuria.       No LMP for male patient.  Past Medical History:   Diagnosis Date    Ankylosing spondylitis     Intussusception      Past Surgical History:   Procedure Laterality Date    ABDOMINAL SURGERY      ARTHROSCOPY OF KNEE Right     COLON SURGERY      hx of intussusception 10-12 years ago    EXTRACORPOREAL SHOCK WAVE LITHOTRIPSY Left 08/15/2019    Procedure: LITHOTRIPSY, ESWL;  Surgeon: Hemal Haney MD;  Location: [facility];  Service: Urology;  Laterality: Left;     Family History   Problem Relation Name Age of Onset    Throat cancer Mother      No Known Problems Father      Colon cancer Neg Hx      Crohn's disease Neg Hx      Ulcerative colitis Neg Hx      Stomach cancer Neg Hx      Esophageal cancer Neg Hx       Social History[1]  Wt Readings from Last 10 Encounters:   03/05/25 62.1 kg (136 lb 14.5 oz)   02/25/25 61.3 kg (135 lb 2.3 oz)   02/25/25 62.1 kg (136 lb 14.5 oz)   08/15/24 63.9 kg (140 lb 14 oz)   06/27/21 61.7 kg (136 lb)   08/15/19 58.9 kg (129 lb 13.6 oz)   08/05/19 58.7 kg (129 lb 6.6 oz)   07/02/19 59 kg (130 lb)   02/09/17  66.7 kg (147 lb)     Lab Results   Component Value Date    WBC 8.76 02/26/2025    HGB 14.8 02/26/2025    HCT 42.2 02/26/2025    MCV 88 02/26/2025     02/26/2025     CMP  Sodium   Date Value Ref Range Status   02/26/2025 133 (L) 136 - 145 mmol/L Final     Potassium   Date Value Ref Range Status   02/26/2025 4.2 3.5 - 5.1 mmol/L Final     Comment:     Anion Gap reference range revised on 4/28/2023     Chloride   Date Value Ref Range Status   02/26/2025 105 95 - 110 mmol/L Final     CO2   Date Value Ref Range Status   02/26/2025 20 (L) 23 - 29 mmol/L Final     Glucose   Date Value Ref Range Status   02/26/2025 79 70 - 110 mg/dL Final     Comment:     The ADA recommends the following guidelines for fasting glucose:    Normal:       less than 100 mg/dL    Prediabetes:  100 mg/dL to 125 mg/dL    Diabetes:     126 mg/dL or higher       BUN   Date Value Ref Range Status   02/26/2025 13 6 - 20 mg/dL Final     Creatinine   Date Value Ref Range Status   02/26/2025 1.19 0.50 - 1.40 mg/dL Final     Calcium   Date Value Ref Range Status   02/26/2025 8.4 (L) 8.7 - 10.5 mg/dL Final     Total Protein   Date Value Ref Range Status   02/26/2025 6.3 6.0 - 8.4 g/dL Final     Albumin   Date Value Ref Range Status   02/26/2025 3.3 (L) 3.5 - 5.2 g/dL Final     Total Bilirubin   Date Value Ref Range Status   02/26/2025 0.9 0.2 - 1.0 mg/dL Final     Alkaline Phosphatase   Date Value Ref Range Status   02/26/2025 57 40 - 150 U/L Final     AST   Date Value Ref Range Status   02/26/2025 14 10 - 40 U/L Final     ALT   Date Value Ref Range Status   02/26/2025 9 (L) 10 - 44 U/L Final     Anion Gap   Date Value Ref Range Status   02/26/2025 8 8 - 16 mmol/L Final     Comment:     Anion Gap reference range revised on 4/28/2023     eGFR if    Date Value Ref Range Status   08/13/2019 >60.0 >60 mL/min/1.73 m^2 Final     eGFR if non    Date Value Ref Range Status   08/13/2019 >60.0 >60 mL/min/1.73 m^2 Final      Comment:     Calculation used to obtain the estimated glomerular filtration  rate (eGFR) is the CKD-EPI equation.        Lab Results   Component Value Date    LIPASE 84 (H) 02/25/2025     Lab Results   Component Value Date    LIPASERES 109 (H) 02/25/2025     Lab Results   Component Value Date    TSH 1.026 08/15/2024     Reviewed prior medical records including radiology report of CT abdomen and pelvis 02/25/2025, CT abdomen and pelvis 07/02/2019, CT abdomen and pelvis 02/09/2017, KUB 03/06/2014 & endoscopy history (see surgical history).    Objective:      Physical Exam  Vitals and nursing note reviewed.   Constitutional:       General: He is not in acute distress.     Appearance: Normal appearance. He is not ill-appearing.   HENT:      Mouth/Throat:      Lips: Pink. No lesions.   Cardiovascular:      Heart sounds: Normal heart sounds.   Pulmonary:      Effort: Pulmonary effort is normal. No respiratory distress.      Breath sounds: Normal breath sounds.   Abdominal:      General: Bowel sounds are normal. There is no distension or abdominal bruit. There are no signs of injury.      Palpations: Abdomen is soft. There is no shifting dullness, fluid wave, hepatomegaly, splenomegaly or mass.      Tenderness: There is abdominal tenderness (mild) in the right lower quadrant. There is no guarding or rebound. Negative signs include Thomason's sign, Rovsing's sign and McBurney's sign.   Skin:     General: Skin is warm and dry.      Coloration: Skin is not jaundiced or pale.   Neurological:      Mental Status: He is alert and oriented to person, place, and time.   Psychiatric:         Attention and Perception: Attention normal.         Mood and Affect: Mood normal.         Speech: Speech normal.         Behavior: Behavior normal.         Assessment:       1. Hospital discharge follow-up    2. Right lower quadrant abdominal pain    3. Abnormal CT scan, gastrointestinal tract    4. History of diarrhea    5. Immunosuppressed  status        Plan:       Hospital discharge follow-up    Right lower quadrant abdominal pain & Abnormal CT scan, gastrointestinal tract  - schedule Colonoscopy in 4-6 weeks, discussed procedure with the patient, including risks and benefits, patient verbalized understanding  - pain resolved    History of diarrhea  - schedule Colonoscopy, discussed procedure with the patient, including risks and benefits, patient verbalized understanding  - resolved  - Recommend increase fiber in diet, especially soluble fiber since this can help bulk up the stool consistency and may help to slow down how fast the stool goes through the colon and can prevent diarrhea  - may continue OTC probiotic, such as Florastor or Culturelle, taken as directed on packaging  - avoid lactose, alcohol, & caffeine  - avoid known triggers    Immunosuppressed status    Follow up in about 4 weeks (around 4/2/2025), or if symptoms worsen or fail to improve.      If no improvement in symptoms or symptoms worsen, call/follow-up at clinic or go to ER.        Total time spent on the encounter includes face to face time and non-face to face time preparing to see the patient (eg, review of tests), Obtaining and/or reviewing separately obtained history, Documenting clinical information in the electronic or other health record, Independently interpreting results (not separately reported) and communicating results to the patient/family/caregiver, or Care coordination (not separately reported).     A dictation software program was used for this note. Please expect some simple typographical  errors in this note.         [1]   Social History  Tobacco Use    Smoking status: Some Days     Current packs/day: 0.00     Types: Vaping with nicotine, Cigarettes     Last attempt to quit: 01/2022     Years since quitting: 3.1     Passive exposure: Past    Smokeless tobacco: Never   Substance Use Topics    Alcohol use: Yes     Comment: occasionally    Drug use: Not Currently      Types: Marijuana     Comment: daily

## 2025-06-06 ENCOUNTER — OCCUPATIONAL HEALTH (OUTPATIENT)
Dept: URGENT CARE | Facility: CLINIC | Age: 40
End: 2025-06-06

## 2025-06-06 DIAGNOSIS — Z02.83 ENCOUNTER FOR DRUG SCREENING: Primary | ICD-10-CM
